# Patient Record
Sex: MALE | Race: BLACK OR AFRICAN AMERICAN | URBAN - METROPOLITAN AREA
[De-identification: names, ages, dates, MRNs, and addresses within clinical notes are randomized per-mention and may not be internally consistent; named-entity substitution may affect disease eponyms.]

---

## 2023-04-14 ENCOUNTER — EMERGENCY (EMERGENCY)
Facility: HOSPITAL | Age: 28
LOS: 1 days | Discharge: HOME | End: 2023-04-14
Attending: EMERGENCY MEDICINE
Payer: MEDICAID

## 2023-04-14 VITALS
HEIGHT: 68 IN | WEIGHT: 184.97 LBS | SYSTOLIC BLOOD PRESSURE: 142 MMHG | OXYGEN SATURATION: 96 % | RESPIRATION RATE: 18 BRPM | TEMPERATURE: 99 F | DIASTOLIC BLOOD PRESSURE: 83 MMHG | HEART RATE: 69 BPM

## 2023-04-14 DIAGNOSIS — F31.11 BIPOLAR DISORDER, CURRENT EPISODE MANIC WITHOUT PSYCHOTIC FEATURES, MILD: ICD-10-CM

## 2023-04-14 LAB
AMPHET UR-MCNC: NEGATIVE — SIGNIFICANT CHANGE UP
ANION GAP SERPL CALC-SCNC: 11 MMOL/L — SIGNIFICANT CHANGE UP (ref 5–17)
APAP SERPL-MCNC: <3 UG/ML — LOW (ref 10–26)
APPEARANCE UR: CLEAR — SIGNIFICANT CHANGE UP
BACTERIA # UR AUTO: ABNORMAL
BARBITURATES UR SCN-MCNC: NEGATIVE — SIGNIFICANT CHANGE UP
BASOPHILS # BLD AUTO: 0.07 K/UL — SIGNIFICANT CHANGE UP (ref 0–0.2)
BASOPHILS NFR BLD AUTO: 0.7 % — SIGNIFICANT CHANGE UP (ref 0–2)
BENZODIAZ UR-MCNC: NEGATIVE — SIGNIFICANT CHANGE UP
BILIRUB UR-MCNC: NEGATIVE — SIGNIFICANT CHANGE UP
BUN SERPL-MCNC: 20.6 MG/DL — HIGH (ref 8–20)
CALCIUM SERPL-MCNC: 9.7 MG/DL — SIGNIFICANT CHANGE UP (ref 8.4–10.5)
CHLORIDE SERPL-SCNC: 99 MMOL/L — SIGNIFICANT CHANGE UP (ref 96–108)
CO2 SERPL-SCNC: 27 MMOL/L — SIGNIFICANT CHANGE UP (ref 22–29)
COCAINE METAB.OTHER UR-MCNC: POSITIVE
COLOR SPEC: YELLOW — SIGNIFICANT CHANGE UP
COMMENT - URINE: SIGNIFICANT CHANGE UP
CREAT SERPL-MCNC: 1.23 MG/DL — SIGNIFICANT CHANGE UP (ref 0.5–1.3)
DIFF PNL FLD: ABNORMAL
EGFR: 83 ML/MIN/1.73M2 — SIGNIFICANT CHANGE UP
EOSINOPHIL # BLD AUTO: 0.08 K/UL — SIGNIFICANT CHANGE UP (ref 0–0.5)
EOSINOPHIL NFR BLD AUTO: 0.8 % — SIGNIFICANT CHANGE UP (ref 0–6)
EPI CELLS # UR: SIGNIFICANT CHANGE UP
ETHANOL SERPL-MCNC: <10 MG/DL — SIGNIFICANT CHANGE UP (ref 0–9)
GLUCOSE SERPL-MCNC: 85 MG/DL — SIGNIFICANT CHANGE UP (ref 70–99)
GLUCOSE UR QL: NEGATIVE MG/DL — SIGNIFICANT CHANGE UP
HCT VFR BLD CALC: 46.1 % — SIGNIFICANT CHANGE UP (ref 39–50)
HGB BLD-MCNC: 16.1 G/DL — SIGNIFICANT CHANGE UP (ref 13–17)
IMM GRANULOCYTES NFR BLD AUTO: 0.4 % — SIGNIFICANT CHANGE UP (ref 0–0.9)
KETONES UR-MCNC: ABNORMAL
LEUKOCYTE ESTERASE UR-ACNC: ABNORMAL
LYMPHOCYTES # BLD AUTO: 2.52 K/UL — SIGNIFICANT CHANGE UP (ref 1–3.3)
LYMPHOCYTES # BLD AUTO: 23.8 % — SIGNIFICANT CHANGE UP (ref 13–44)
MCHC RBC-ENTMCNC: 32.7 PG — SIGNIFICANT CHANGE UP (ref 27–34)
MCHC RBC-ENTMCNC: 34.9 GM/DL — SIGNIFICANT CHANGE UP (ref 32–36)
MCV RBC AUTO: 93.7 FL — SIGNIFICANT CHANGE UP (ref 80–100)
METHADONE UR-MCNC: NEGATIVE — SIGNIFICANT CHANGE UP
MONOCYTES # BLD AUTO: 1.38 K/UL — HIGH (ref 0–0.9)
MONOCYTES NFR BLD AUTO: 13.1 % — SIGNIFICANT CHANGE UP (ref 2–14)
NEUTROPHILS # BLD AUTO: 6.48 K/UL — SIGNIFICANT CHANGE UP (ref 1.8–7.4)
NEUTROPHILS NFR BLD AUTO: 61.2 % — SIGNIFICANT CHANGE UP (ref 43–77)
NITRITE UR-MCNC: NEGATIVE — SIGNIFICANT CHANGE UP
OPIATES UR-MCNC: NEGATIVE — SIGNIFICANT CHANGE UP
PCP SPEC-MCNC: SIGNIFICANT CHANGE UP
PCP UR-MCNC: NEGATIVE — SIGNIFICANT CHANGE UP
PH UR: 5 — SIGNIFICANT CHANGE UP (ref 5–8)
PLATELET # BLD AUTO: 336 K/UL — SIGNIFICANT CHANGE UP (ref 150–400)
POTASSIUM SERPL-MCNC: 4.3 MMOL/L — SIGNIFICANT CHANGE UP (ref 3.5–5.3)
POTASSIUM SERPL-SCNC: 4.3 MMOL/L — SIGNIFICANT CHANGE UP (ref 3.5–5.3)
PROT UR-MCNC: 30 MG/DL
RBC # BLD: 4.92 M/UL — SIGNIFICANT CHANGE UP (ref 4.2–5.8)
RBC # FLD: 11.4 % — SIGNIFICANT CHANGE UP (ref 10.3–14.5)
RBC CASTS # UR COMP ASSIST: SIGNIFICANT CHANGE UP /HPF (ref 0–4)
SALICYLATES SERPL-MCNC: <0.6 MG/DL — LOW (ref 10–20)
SARS-COV-2 RNA SPEC QL NAA+PROBE: SIGNIFICANT CHANGE UP
SODIUM SERPL-SCNC: 136 MMOL/L — SIGNIFICANT CHANGE UP (ref 135–145)
SP GR SPEC: 1.02 — SIGNIFICANT CHANGE UP (ref 1.01–1.02)
THC UR QL: POSITIVE
UROBILINOGEN FLD QL: NEGATIVE MG/DL — SIGNIFICANT CHANGE UP
WBC # BLD: 10.57 K/UL — HIGH (ref 3.8–10.5)
WBC # FLD AUTO: 10.57 K/UL — HIGH (ref 3.8–10.5)
WBC UR QL: SIGNIFICANT CHANGE UP /HPF (ref 0–5)

## 2023-04-14 NOTE — ED BEHAVIORAL HEALTH ASSESSMENT NOTE - NSBHATTESTBILLING_PSY_A_CORE
[id number] diagnostic evaluation with medical services [id number] diagnostic evaluation with medical services [id number] diagnostic evaluation with medical services

## 2023-04-14 NOTE — ED BEHAVIORAL HEALTH ASSESSMENT NOTE - DESCRIPTION
ICU Vital Signs Last 24 Hrs  T(C): 37.2 (14 Apr 2023 14:39), Max: 37.2 (14 Apr 2023 14:39)  T(F): 98.9 (14 Apr 2023 14:39), Max: 98.9 (14 Apr 2023 14:39)  HR: 69 (14 Apr 2023 14:39) (69 - 69)  BP: 142/83 (14 Apr 2023 14:39) (142/83 - 142/83)  BP(mean): --  ABP: --  ABP(mean): --  RR: 18 (14 Apr 2023 14:39) (18 - 18)  SpO2: 96% (14 Apr 2023 14:39) (96% - 96%)    O2 Parameters below as of 14 Apr 2023 14:39  Patient On (Oxygen Delivery Method): room air G6PD deficiency, elevated CPK, seizures, kidney disease (?) Domiciled with mom, works as a

## 2023-04-14 NOTE — ED PROVIDER NOTE - CLINICAL SUMMARY MEDICAL DECISION MAKING FREE TEXT BOX
28-year-old male presents to ED for medication refill.  Patient explained that he was seen a year or 2 ago at [facility].  Patient explained that he was placed on medication at that time including Depakote.  Patient explained that he has not been on medication and was seen at  [facility] where he was examined and told to follow-up in the ED Psychiatry Medical management. HEENT: Normal findings, Eyes : PERRLA, EOMI , Nares clear and Throat : WNL  Lungs: Clear B/L with good air entry  CVS: S1-S2 , with no murmurs  Abd : Normal BS, with no tenderness or organomegaly  Ext: Normal findings

## 2023-04-14 NOTE — CHART NOTE - NSCHARTNOTEFT_GEN_A_CORE
SWNote: pt seen by behavioral provider , pt to benefit from therapy. [facility] services explained in agreement to appt. Schedule reviewed, appt given for April        at                / Release of info signed per protocol. Services via telephone pt aware, best number to get         Aware to call 911 and/or go to nearest ED if symptoms worsen. SWNote: pt seen by behavioral provider , pt to benefit from therapy. [facility] services explained in agreement to appt. Schedule reviewed, appt given for April 18th at 14:30 / Release of info signed per protocol. Services via telephone pt aware, best number to get pt its his mother's cell (Ila) [phone] .Aware to call 911 and/or go to nearest ED if symptoms worsen. No other SW needs reported at this moment. Email to be sent asap. SWNote: pt seen by behavioral provider , pt to benefit from therapy. [facility] services explained in agreement to appt. Schedule reviewed, appt given for April 18th at 14:30 / Release of info signed per protocol. Services via telephone pt aware, best number to get pt its his mother's cell (Ila) [phone] .Aware to call 911 and/or go to nearest ED if symptoms worsen. No other SW needs reported at this moment. Email to be sent asap. SWNote: pt seen by behavioral provider , pt to benefit from therapy. [facility] services explained in agreement to appt. Schedule reviewed, appt given for April 18th at 14:30 / Release of info signed per protocol. Services via telephone pt aware, best number to get pt its his mother's cell (Ila) [phone] .Aware to call 911 and/or go to nearest ED if symptoms worsen. No other SW needs reported at this moment. Email to be sent asap.
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
## 2023-04-14 NOTE — ED ADULT NURSE NOTE - OBJECTIVE STATEMENT
Pt requesting to start back on bi polar medication - pt presents in no acute distress pending md pio segura rn

## 2023-04-14 NOTE — ED PROVIDER NOTE - NSFOLLOWUPINSTRUCTIONS_ED_ALL_ED_FT
Follow-up with family services Tr as discussed.  Follow-up with primary care physician/[facility]. Follow-up with family services Tr as discussed.  Follow-up with primary care physician/[facility]. Follow-up with family services Tr as discussed.  Follow-up with primary care physician/[facility].

## 2023-04-14 NOTE — ED PROVIDER NOTE - PATIENT PORTAL LINK FT
You can access the FollowMyHealth Patient Portal offered by [facility] by registering at the following website: [email or web address]. By joining [employer]’s FollowMyHealth portal, you will also be able to view your health information using other applications (apps) compatible with our system. You can access the FollowMyHealth Patient Portal offered by [facility] by registering at the following website: [email or web address]. By joining [employer]’s FollowMyHealth portal, you will also be able to view your health information using other applications (apps) compatible with our system. You can access the FollowMyHealth Patient Portal offered by [facility] by registering at the following website: [email or web address]. By joining [employer]’s FollowMyHealth portal, you will also be able to view your health information using other applications (apps) compatible with our system.

## 2023-04-14 NOTE — ED BEHAVIORAL HEALTH ASSESSMENT NOTE - VIOLENCE PROTECTIVE FACTORS:
Relationship stability/Engagement in treatment/Insight into violence risk and need for management/treatment

## 2023-04-14 NOTE — ED BEHAVIORAL HEALTH ASSESSMENT NOTE - SUMMARY
Patient is a 28 year old AA male, domiciled in [place] with mom, with a PPHx Bipolar D/O, polysubstance abuse, PMHx G6PD deficiency, seizures (likely in context of past concussions + substance use), elevated CPK and, per pt, pre kidney disease, with no prior IPU, no prior SA, 1 prior rehab stay at [place] in 2022, in therapy at [facility], not in outpatient psychiatric treatment, [facility], BIB EMS for medication refill.     Pt presented well-groomed, hyperverbal with pressured speech on evaluation. He reports he was diagnosed with Bipolar D/O while at [facility] for inpatient rehab from crack cocaine in 4/2022, and was prescribed Depakote, Seroquel and Clonidine which he stopped taking around July 2022 (Depakote because it made him increasingly aggressive and violent, Seroquel and Clonidine because he ran out and did not attend any follow up). He reports he does not want to continue off of medications as he is starting a new job and is afraid he will mess it up, wants to get stable. He reports increasing jeramie x1 month, reporting he can't sit still, feels "on top of the world, fearless." He states he has been spending a lot of money and has been feeling more impulsive. He is endorsing that he is "the second greatest person, second to Ace." Reports flight of ideas, feeling easily bored and starting a lot of projects. He states he chooses to stay up late often, but could sleep if he wanted to, and reports decreased appetite lately. When asked directly, he denies SI/HI/I/P, AVH and urges for NSSIB.    Mom reported some concerns regarding the patient's safety, especially if he were to try and hurt someone else and they retaliated. He does not, however, meet criteria for involuntary inpatient hospitalization at this time. Plan is for referral for outpatient psychiatric treatment. Patient is a 28 year old AA male, domiciled in [place] with mom, with a PPHx Bipolar D/O, polysubstance abuse, PMHx G6PD deficiency, seizures (likely in context of past concussions + substance use), elevated CPK and, per pt, pre kidney disease, with no prior IPU, no prior SA, 1 prior rehab stay at [place] in 2022, in therapy at [facility], not in outpatient psychiatric treatment, [facility] at  [facility], BIB EMS for medication refill.     Pt presented well-groomed, hyperverbal with pressured speech on evaluation. He reports he was diagnosed with Bipolar D/O while at [place] for inpatient rehab from crack cocaine in 4/2022, and was prescribed Depakote, Seroquel and Clonidine which he stopped taking around July 2022 (Depakote because it made him increasingly aggressive and violent, Seroquel and Clonidine because he ran out and did not attend any follow up). He reports he does not want to continue off of medications as he is starting a new job and is afraid he will mess it up, wants to get stable. He reports increasing jeramie x1 month, reporting he can't sit still, feels "on top of the world, fearless." He states he has been spending a lot of money and has been feeling more impulsive. He is endorsing that he is "the second greatest person, second to Ace." Reports flight of ideas, feeling easily bored and starting a lot of projects. He states he chooses to stay up late often, but could sleep if he wanted to, and reports decreased appetite lately. When asked directly, he denies SI/HI/I/P, AVH and urges for NSSIB.    Mom reported some concerns regarding the patient's safety, especially if he were to try and hurt someone else and they retaliated. He does not, however, meet criteria for involuntary inpatient hospitalization at this time. Plan is for referral for outpatient psychiatric treatment. Patient is a 28 year old AA male, domiciled in [place] with mom, with a PPHx Bipolar D/O, polysubstance abuse, PMHx G6PD deficiency, seizures (likely in context of past concussions + substance use), elevated CPK and, per pt, pre kidney disease, with no prior IPU, no prior SA, 1 prior rehab stay at [place] in 2022, in therapy at [facility], not in outpatient psychiatric treatment, [facility], BIB EMS for medication refill.     Pt presented well-groomed, hyperverbal with pressured speech on evaluation. He reports he was diagnosed with Bipolar D/O while at [facility] for inpatient rehab from crack cocaine in 4/2022, and was prescribed Depakote, Seroquel and Clonidine which he stopped taking around July 2022 (Depakote because it made him increasingly aggressive and violent, Seroquel and Clonidine because he ran out and did not attend any follow up). He reports he does not want to continue off of medications as he is starting a new job and is afraid he will mess it up, wants to get stable. He reports increasing jeramie x1 month, reporting he can't sit still, feels "on top of the world, fearless." He states he has been spending a lot of money and has been feeling more impulsive. He is endorsing that he is "the second greatest person, second to Ace." Reports flight of ideas, feeling easily bored and starting a lot of projects. He states he chooses to stay up late often, but could sleep if he wanted to, and reports decreased appetite lately. When asked directly, he denies SI/HI/I/P, AVH and urges for NSSIB.    Mom reported some concerns regarding the patient's safety, especially if he were to try and hurt someone else and they retaliated. He does not, however, meet criteria for involuntary inpatient hospitalization at this time. Plan is for referral for outpatient psychiatric treatment. Patient is a 28 year old AA male, domiciled in [place] with mom, with a PPHx Bipolar D/O, polysubstance abuse, PMHx G6PD deficiency, seizures (likely in context of past concussions + substance use), elevated CPK and, per pt, pre kidney disease, with no prior IPU, no prior SA, 1 prior rehab stay at [place] in 2022, in therapy at [facility], not in outpatient psychiatric treatment, [facility], BIB EMS for medication refill.     Pt presented well-groomed, hyperverbal with pressured speech on evaluation. He reports he was diagnosed with Bipolar D/O while at [facility] for inpatient rehab from crack cocaine in 4/2022, and was prescribed Depakote, Seroquel and Clonidine which he stopped taking around July 2022 (Depakote because it made him increasingly aggressive and violent, Seroquel and Clonidine because he ran out and did not attend any follow up). He reports he does not want to continue off of medications as he is starting a new job and is afraid he will mess it up, wants to get stable. He reports increasing jeramie x1 month, reporting he can't sit still, feels "on top of the world, fearless." He states he has been spending a lot of money and has been feeling more impulsive. He is endorsing that he is "the second greatest person, second to Ace." Denies inability to sleep.  When asked directly, he denies SI/HI/I/P, AVH and urges for NSSIB. Patient does not meet criteria for involuntary hospitalization at this time and was encouraged to present back to ED for inability to sleep, SI/HI, AVH or worsening sxs and was agreeable.     Mom requesting patient be held against his will without offering evidence of imminent danger to self or others. He does not meet criteria for involuntary inpatient hospitalization at this time. Plan is for referral for outpatient psychiatric treatment, pt accepting of referral to [facility]. Patient is a 28 year old AA male, domiciled in [place] with mom, with a PPHx Bipolar D/O, polysubstance abuse, PMHx G6PD deficiency, seizures (likely in context of past concussions + substance use), elevated CPK and, per pt, pre kidney disease, with no prior IPU, no prior SA, 1 prior rehab stay at [place] in 2022, in therapy at [facility], not in outpatient psychiatric treatment, [facility], BIB EMS for medication refill.     Pt presented well-groomed, hyperverbal with pressured speech on evaluation. He reports he was diagnosed with Bipolar D/O while at [facility] for inpatient rehab from crack cocaine in 4/2022, and was prescribed Depakote, Seroquel and Clonidine which he stopped taking around July 2022 (Depakote because it made him increasingly aggressive and violent, Seroquel and Clonidine because he ran out and did not attend any follow up). He reports he does not want to continue off of medications as he is starting a new job and is afraid he will mess it up, wants to get stable. He reports increasing jeramie x1 month, reporting he can't sit still, feels "on top of the world, fearless." He states he has been spending a lot of money and has been feeling more impulsive. He is endorsing that he is "the second greatest person, second to Ace." Denies inability to sleep.  When asked directly, he denies SI/HI/I/P, AVH and urges for NSSIB. Patient does not meet criteria for involuntary hospitalization at this time and was encouraged to present back to ED for inability to sleep, SI/HI, AVH or worsening sxs and was agreeable.     Mom requesting patient be held against his will without offering evidence of imminent danger to self or others. He does not meet criteria for involuntary inpatient hospitalization at this time. Plan is for referral for outpatient psychiatric treatment, pt accepting of referral to [facility]. Patient is a 28 year old AA male, domiciled in [place] with mom, with a PPHx Bipolar D/O, polysubstance abuse, PMHx G6PD deficiency, seizures (likely in context of past concussions + substance use), elevated CPK and, per pt, pre kidney disease, with no prior IPU, no prior SA, 1 prior rehab stay at [facility] in 2022, in therapy at [facility], not in outpatient psychiatric treatment, [facility], BIB EMS for medication refill.     Pt presented well-groomed, hyperverbal with pressured speech on evaluation. He reports he was diagnosed with Bipolar D/O while at [facility] for inpatient rehab from crack cocaine in 4/2022, and was prescribed Depakote, Seroquel and Clonidine which he stopped taking around July 2022 (Depakote because it made him increasingly aggressive and violent, Seroquel and Clonidine because he ran out and did not attend any follow up). He reports he does not want to continue off of medications as he is starting a new job and is afraid he will mess it up, wants to get stable. He reports increasing jeramie x1 month, reporting he can't sit still, feels "on top of the world, fearless." He states he has been spending a lot of money and has been feeling more impulsive. He is endorsing that he is "the second greatest person, second to Ace." Denies inability to sleep.  When asked directly, he denies SI/HI/I/P, AVH and urges for NSSIB. Patient does not meet criteria for involuntary hospitalization at this time and was encouraged to present back to ED for inability to sleep, SI/HI, AVH or worsening sxs and was agreeable.     Mom requesting patient be held against his will without offering evidence of imminent danger to self or others. He does not meet criteria for involuntary inpatient hospitalization at this time. Plan is for referral for outpatient psychiatric treatment, pt accepting of [facility] to [facility]. Patient is a 28 year old AA male, domiciled in [place] with mom, with a PPHx Bipolar D/O, polysubstance abuse, PMHx G6PD deficiency, seizures (likely in context of past concussions + substance use), elevated CPK and, per pt, pre kidney disease, with no prior IPU, no prior SA, 1 prior rehab stay at [facility] in 2022, in therapy at [facility], not in outpatient psychiatric treatment, [facility], BIB EMS for medication refill.     Pt presented well-groomed, hyperverbal with pressured speech on evaluation. He reports he was diagnosed with Bipolar D/O while at [facility] for inpatient rehab from crack cocaine in 4/2022, and was prescribed Depakote, Seroquel and Clonidine which he stopped taking around July 2022 (Depakote because it made him increasingly aggressive and violent, Seroquel and Clonidine because he ran out and did not attend any follow up). He reports he does not want to continue off of medications as he is starting a new job and is afraid he will mess it up, wants to get stable. He reports increasing jeramie x1 month, reporting he can't sit still, feels "on top of the world, fearless." He states he has been spending a lot of money and has been feeling more impulsive. He is endorsing that he is "the second greatest person, second to Ace." Denies inability to sleep. When asked directly, he denies SI/HI/I/P, AVH and urges for NSSIB. Patient offered inpatient admission if he feels unsafe or at risk, but declined stating he does not need it and can manage outpatient. He feels he is sleeping well and wants to go to work. Patient does not meet criteria for involuntary hospitalization at this time and was encouraged to present back to ED for inability to sleep, SI/HI, AVH or worsening sxs and was agreeable.     Mom requesting patient be held against his will without offering evidence of imminent danger to self or others. He does not meet criteria for involuntary inpatient hospitalization at this time. Plan is for referral for outpatient psychiatric treatment, pt accepting of referral to [facility]. Patient is a 28 year old AA male, domiciled in [place] with mom, with a PPHx Bipolar D/O, polysubstance abuse, PMHx G6PD deficiency, seizures (likely in context of past concussions + substance use), elevated CPK and, per pt, pre kidney disease, with no prior IPU, no prior SA, 1 prior rehab stay at [place] in 2022, in therapy at [facility], not in outpatient psychiatric treatment, [facility], BIB EMS for medication refill.     Pt presented well-groomed, hyperverbal with pressured speech on evaluation. He reports he was diagnosed with Bipolar D/O while at [facility] for inpatient rehab from crack cocaine in 4/2022, and was prescribed Depakote, Seroquel and Clonidine which he stopped taking around July 2022 (Depakote because it made him increasingly aggressive and violent, Seroquel and Clonidine because he ran out and did not attend any follow up). He reports he does not want to continue off of medications as he is starting a new job and is afraid he will mess it up, wants to get stable. He reports increasing jeramie x1 month, reporting he can't sit still, feels "on top of the world, fearless." He states he has been spending a lot of money and has been feeling more impulsive. He is endorsing that he is "the second greatest person, second to Ace." Denies inability to sleep. When asked directly, he denies SI/HI/I/P, AVH and urges for NSSIB. Patient offered inpatient admission if he feels unsafe or at risk, but declined stating he does not need it and can manage outpatient. He feels he is sleeping well and wants to go to work. Patient does not meet criteria for involuntary hospitalization at this time and was encouraged to present back to ED for inability to sleep, SI/HI, AVH or worsening sxs and was agreeable.     Mom requesting patient be held against his will without offering evidence of imminent danger to self or others. He does not meet criteria for involuntary inpatient hospitalization at this time. Plan is for referral for outpatient psychiatric treatment, pt accepting of referral to [facility]. Patient is a 28 year old AA male, domiciled in [place] with mom, with a PPHx Bipolar D/O, polysubstance abuse, PMHx G6PD deficiency, seizures (likely in context of past concussions + substance use), elevated CPK and, per pt, pre kidney disease, with no prior IPU, no prior SA, 1 prior rehab stay at [facility] in 2022, in therapy at [facility], not in outpatient psychiatric treatment, [facility] at  [facility], BIB EMS for medication refill.     Pt presented well-groomed, hyperverbal with pressured speech on evaluation. He reports he was diagnosed with Bipolar D/O while at [facility] for inpatient rehab from crack cocaine in 4/2022, and was prescribed Depakote, Seroquel and Clonidine which he stopped taking around July 2022 (Depakote because it made him increasingly aggressive and violent, Seroquel and Clonidine because he ran out and did not attend any follow up). He reports he does not want to continue off of medications as he is starting a new job and is afraid he will mess it up, wants to get stable. He reports increasing jeramie x1 month, reporting he can't sit still, feels "on top of the world, fearless." He states he has been spending a lot of money and has been feeling more impulsive. He is endorsing that he is "the second greatest person, second to Ace." Denies inability to sleep. When asked directly, he denies SI/HI/I/P, AVH and urges for NSSIB. Patient offered inpatient admission if he feels unsafe or at risk, but declined stating he does not need it and can manage outpatient. He feels he is sleeping well and wants to go to work. Patient does not meet criteria for involuntary hospitalization at this time and was encouraged to present back to ED for inability to sleep, SI/HI, AVH or worsening sxs and was agreeable.     Mom requesting patient be held against his will without offering evidence of imminent danger to self or others. He does not meet criteria for involuntary inpatient hospitalization at this time. Plan is for referral for outpatient psychiatric treatment, pt accepting of referral to [facility].

## 2023-04-14 NOTE — ED PROVIDER NOTE - NSFOLLOWUPCLINICS_GEN_ALL_ED_FT
[address] [place] [address]  Phone: [phone]  Fax:   Follow Up Time: Routine     [id number] [place] [address]  Phone: [phone]  Fax:   Follow Up Time: Routine     [id number] [place] [address]  Phone: [phone]  Fax:   Follow Up Time: Routine

## 2023-04-14 NOTE — ED PROVIDER NOTE - PROGRESS NOTE DETAILS
Patient case discussed with behavioral health.  We will help states someone will come and see patient as soon as possible.  Patient laboratory work order patient awaiting  evaluation Patient urinalysis and labs within normal limits patient urinalysis positive for THC and cocaine.

## 2023-04-14 NOTE — ED PROVIDER NOTE - NS ED ATTENDING STATEMENT MOD
This was a shared visit with the ANTONIO. I reviewed and verified the documentation and independently performed the documented: This was a shared visit with the ATNONIO. I reviewed and verified the documentation and independently performed the documented:

## 2023-04-14 NOTE — ED PROVIDER NOTE - ATTENDING APP SHARED VISIT CONTRIBUTION OF CARE
I, Tal Roman, performed a face to face bedside interview with this patient regarding history of present illness, review of symptoms and relevant past medical, social and family history.  I completed an independent physical examination. I have communicated the patient’s plan of care and disposition with the ACP.  28 year old presents with bipolar disorder. Pt states that he was off of his meds x 1 year, went to detox, was told to come here for psych meds.   Gen: NAD, well appearing  CV: RRR  Pul: CTA b/l  Abd: Soft, non-distended, non-tender  Neuro: no focal deficits  Pt medically cleared and cleared by psych for outpt f/u

## 2023-04-14 NOTE — ED BEHAVIORAL HEALTH ASSESSMENT NOTE - NSBHATTESTAPPBILLTIME_PSY_A_CORE
I attest my time as ANTONIO is greater than 50% of the total combined time spent on qualifying patient care activities. I have reviewed and verified the documentation.

## 2023-04-14 NOTE — ED ADULT NURSE NOTE - NSIMPLEMENTINTERV_GEN_ALL_ED
Implemented All Universal Safety Interventions:  [place] to call system. Call bell, personal items and telephone within reach. Instruct patient to call for assistance. Room bathroom lighting operational. Non-slip footwear when patient is off stretcher. Physically safe environment: no spills, clutter or unnecessary equipment. Stretcher in lowest position, wheels locked, appropriate side rails in place. Implemented All Universal Safety Interventions:  [place] to call system. Call bell, personal items and telephone within reach. Instruct patient to call for assistance. Room bathroom lighting operational. Non-slip footwear when patient is off stretcher. Physically safe environment: no spills, clutter or unnecessary equipment. Stretcher in lowest position, wheels locked, appropriate side rails in place. Implemented All Universal Safety Interventions:  [place] to call system. Call bell, personal items and telephone within reach. Instruct patient to call for assistance. Room bathroom lighting operational. Non-slip footwear when patient is off stretcher. Physically safe environment: no spills, clutter or unnecessary equipment. Stretcher in lowest position, wheels locked, appropriate side rails in place.
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
## 2023-04-14 NOTE — ED PROVIDER NOTE - OBJECTIVE STATEMENT
28-year-old male presents to ED for medication refill.  Patient explained that he was seen a year or 2 ago at [facility].  Patient explained that he was placed on medication at that time including Depakote.  Patient explained that he has not been on medication and was seen at  post where he was examined and told to follow-up in the ED Psychiatry Medical management. Pt admits to a history of Bipolar and ADHD for which he is not on any current medication. Pt denies any SI/ HI at this time. Pt states that he feels like that he needs to take his medication or he would loose control and flip out. 28-year-old male presents to ED for medication refill.  Patient explained that he was seen a year or 2 ago at [facility].  Patient explained that he was placed on medication at that time including Depakote.  Patient explained that he has not been on medication and was seen at  post where he was examined and told to follow-up in the ED Psychiatry Medical management. Pt admits to a history of Bipolar and ADHD for which he is not on any current medication. Pt denies any SI/ HI at this time. Pt states that he feels like that he needs to take his medication or he would loose control and flip out. Pt admits to using Cocaine and marihuana x 2 day ago. Pt denies any ETOH use.

## 2023-04-14 NOTE — ED ADULT TRIAGE NOTE - CHIEF COMPLAINT QUOTE
BIBA from  post known hx of Bipolar off meds x 2 years states "I want to go back meds." Denies SI/HI/VH/AH.

## 2025-02-08 NOTE — ED BEHAVIORAL HEALTH ASSESSMENT NOTE - NSBHPSYCHOLCOGORIENT_PSY_A_CORE
A: Met with patient to explain  role and to discuss aftercare options.    R: Outpatient Mental Health Medication Provider:    Patient does not see a provider for outpatient mental health medication management and is not interested in one. No RENETTA obtained for this provider.    Primary Care Provider:   Patient currently sees PCP Jesus Rhoades. Patient does not want information shared with this provider. Patient did not sign an RENETTA for this provider.    Therapist:  Patient does not see an outpatient therapist but would like to be set up with one. Patient signed an RENETTA for this provider and would like an aftercare appointment made.    :  NA    Patient is aware of aftercare options. Patient has virtual capabilities. Patient confirmed address and telephone number listed on the facesheet. Patient unsure about PHP/IOP     P: Will collaborate with treatment team and with the patient before setting up further aftercare, which will be complete by time of discharge.     UNA Beck     
Oriented to time, place, person, situation